# Patient Record
(demographics unavailable — no encounter records)

---

## 2024-11-18 NOTE — PHYSICAL EXAM
[Fully active, able to carry on all pre-disease performance without restriction] : Status 0 - Fully active, able to carry on all pre-disease performance without restriction [Normal] : affect appropriate [Ulcers] : no ulcers [Vesicles] : no vesicles [de-identified] : supple [de-identified] : (+)S1S2 RRR [de-identified] : no edema [de-identified] : few skin bruises on bilateral arms

## 2024-11-18 NOTE — REASON FOR VISIT
[Follow-Up Visit] : a follow-up visit for [Spouse] : spouse [FreeTextEntry2] :  follow-up visit for primary myelofibrosis and thrombocytosis

## 2024-11-18 NOTE — REVIEW OF SYSTEMS
[Fatigue] : fatigue [Diarrhea: Grade 0] : Diarrhea: Grade 0 [Joint Pain] : joint pain [Joint Stiffness] : joint stiffness [Insomnia] : insomnia [Negative] : Heme/Lymph [Fever] : no fever [Chills] : no chills [Night Sweats] : no night sweats [Recent Change In Weight] : ~T no recent weight change [Eye Pain] : no eye pain [Vision Problems] : no vision problems [Muscle Pain] : no muscle pain [Suicidal] : not suicidal [Anxiety] : no anxiety [Depression] : no depression [Proptosis] : no proptosis [Hot Flashes] : no hot flashes [Muscle Weakness] : no muscle weakness [Deepening Of The Voice] : no deepening of the voice [FreeTextEntry9] : per HPI

## 2024-11-18 NOTE — HISTORY OF PRESENT ILLNESS
[de-identified] : 74 y/o M with CAD (no PCI-refused) with LBBB following with Dr. Nascimento, also with history of thalassemia minor, and hypothyroid on synthroid here for evaluation of thrombocytosis. Patient had surgery for inguinal hernia in 2019, and he reported that he still has "something in there" that feels like a bump. Once in a while he said it will bother him but doesn't last very long. Previously had L sided inguinal hernia surgery over a decade ago and had a mesh placed which was complicated by bleeding. No history of having spleen removed. Patient denied any chest pain or dyspnea on exertion. He denied any increase in fatigue lately and he has a normal appetite. He has IBS that he has had for a very long time. Denied any night sweats. He has never had a blood clot, and he denied any family history of blood clots. Denied any history of bleeding episodes. No new skin rashes. He complains of chronic knee pain bilaterally that he has been suffering from for decades due to his job. He has a torn ACL, gets cortisone injections here and there. His R knee may be a little bit swollen and he feels he will need a replacement eventually. Otherwise he also has some herniated discs in his neck which give him a little pain, he will be doing PT. Has history of 2 arthroscopies on his R knee but otherwise no orthopedic history. Also complains of tinnitus for 3-4 years, constantly there but it varies in its intensity. Alcohol aggravates it so he has cut back recently. He typically used to drink "quite a bit" of alcohol but lately its been one drink daily on average. Was a light smoker in his 30s but not since then. No other illicit drugs.  Platelet levels on initial evaluation were sustained over 1 million, and went up over 1.4 million. vWF activity and antigen level were normal. 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 late ET with developing MF. Grade 0-1 reticulin fibrosis seen. He is taking aspirin daily..Bili had been noted to be elevated on blood work and had an abdominal US which showed some hepatosplenomegaly (mild) but otherwise unremarkable. Started on Jakafi which has been fairly effective.  Of note, has also been dealing with severe OA in R knee and back pain related to spinal stenosis.   Disease: PMF   Pathology: (4/27/22)Final Diagnosis : 1, 2. Bone marrow biopsy and bone marrow aspirate  - JAK2 positive myeloproliferative neoplasm  - Hypercellular bone marrow (more than 90% cellularity)  with erythroid predominant trilineage hematopoiesis and megakaryocytosis  Diagnostic Note: Per chart review, the patient with mild microcytic anemia, with a known  history of thalassemia minor and thrombocytosis. [id number] is positive.  Current bone marrow biopsy shows hypercellular bone marrow (more than  90% cellularity) with erythroid predominant trilineage hematopoiesis  and megakaryocytosis. Please note findings of a normal male karyotype, negative PDGFRA  FISH arrangement. [facility] myeloid NGS panel shows the following genomic  alterations Tier I: Variants of Strong Clinical Significance: ASXL1  p.[id number] ( [place]lele Freq.: 44%), JAK2 p.[id number] ( Allele Freq.:  28%) , Tier II: Variants of Potential Clinical Significance SF3B1 p.Ilw051Ood (Allele Freq.: 6%).  Based on the additional findings, the diagnosis has not changed.  Morphology: Microscopic description: 1. Biopsy: Sections of bone marrow biopsy and bone marrow fragments in  clot show hypercellularity (more than 90% cellularity), with erythroid  predominant trilineage hematopoiesis with maturation. M:E ratio  is reduced. Megakaryocytes are increased in number with many  large hyperlobulated megakaryocytes with abundant cytoplasm.  Occasional hyperchromatic megakaryocytes high N/C ratio. Megkarayocytes  demonstrates clustering. Iron stores are present.  2. Aspirate: Spicular and cellular; adequate for interpretation. Myeloid  and erythroid elements show progressive maturation with no  overt dysplasia or increase in blasts. Occasional erythroid elements show  budding. Megakaryocytes are increased in number and show clustering and  shows many hyperlobulated forms.   Tumor/ Prognostic Markers: Result: Normal male karyotype Karyotype: 46,XY[20].   Prognostics Scoring System: MIPSS70+ = 4 (intermediate risk), Myelofibrosis Scoring System: GIPSS: Int - 2   Current Treatment Status: Therapy: Jakafi.   [de-identified] : Patient seen for follow up on 11/18/2024. He is still on 5 mg BID of ruxolitinib. Platelets uptrending again and hemoglobin is down to 9.8. He is complaining of headache now for over a month without any abatement in the front of his head. Also mentioned that there was one time in [place] 3 weeks ago when he scratched something on his leg it started bleeding and it was completely unstoppable - was "like a faucet" and required wrapping really tight with a bandage which he kept on overnight, and it was still oozing the next day. Otherwise, his appetite is overall good - he eats two meals per day. Down 6 pounds in the last calendar year. Denied any night sweats at this point.

## 2024-11-18 NOTE — ASSESSMENT
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  BMBx on 4/27/22 showing evidence of PMN with JAK2 positivity. BM showed hypercellularity (>90% cellularity) and megakaryocytes are increased in number with many large hyperlobulated megakaryocytes with abundant cytoplasm and occasional hyperchromatic megakaryocytes w/ high N/C ratio. Megakaryocytes demonstrated clustering. There was also the presence of grade 0-1 reticulin fibrosis. Given this, this possibly can represent PMF although possibility of ET not ruled out. Discussed with hematopathology extensively and considering significant clustering and hypercellularity, this marrow is a little bit less consistent with a diagnosis of essential thrombocythemia and more likely represents a PMF diagnosis.  On risk stratification, on presentation considered intermediate risk via both GIPSS and MIPSS scoring system - discussed this with patient and wife after diagnosis. CALR and MPL were negative on peripheral blood with (+) JAK2 mutation. On presentation he was additionally found to have elevated T. Bili and US abdomen showed HSM. Encouraged abstinence from alcohol and this has improved to normal.  Since starting Ruxolitinib he has been with improvement of symptoms (mainly fatigue) and thrombocytosis- however anemia remains a dose limiting issue and now with worsening symptoms of anemia. Also with worsening thrombocytosis over 1M. Referred for transplant evaluation, currently considering options. Ruxolitinib was at 10mg BID but had worsening thrombcytosis and anemia. Had added hydroxyurea 500 mg QOD to control platelet count with decreasing Ruxolitinib dose, but was ineffective. Plan had been for possible switch to momelotinib - a newer JAK2 inhibitor which can have erythropoietic effects. Goal of therapy is to decrease thrombotic risk and increase quality of life.   Had been on Ruxolitinib taper down to 5 mg BID with continued hydroxyurea 500 mg daily. CBC then showed improvement in counts and PLT were 394K. Given improvement of his counts as well as symptoms, Ruxolitinib 5 mg BID was continued, and Hydea was stopped on 7/22/24. May consider Momelotinib in the future if platelets go up to very high levels off Hydrea.  Plan: -CBC reviewed today - hemoglobin 9.8 and platelets 674k.  -Continue Ruxolitinib 5 mg BID. -May consider Momelotinib in the future if platelets go up to very high levels off Hydrea. -One of the challenges with him is that he also has thalassemia trait, so he has very mild baseline microcytosis. No indication for iron supplementation. -Patient will continue on aspirin 81mg PO q day for thrombosis prophylaxis. He has been taking as prescribed. -Again discussed allogeneic transplantation. s/p initial visit at this point and considering options. Discussed the risks and benefits of undergoing allogeneic bone marrow transplant. Discussed that with his disease, the transplantation is the only way to cure, and without the transplantation there is a risk of development of AML in the future. Discussed the risk for GVHD and procedure related mortality with the allogeneic transplant. He vocalized his understanding. -Continue follow up with Cardiology. -Frontal headache - over 1 month with congestion and mucousy discharge - will check CT sinuses to rule out sinusitis.  -Patient understands and agrees with plan. All information explained to the best of my ability.  -RTC in 1 month.

## 2024-11-25 NOTE — DISCUSSION/SUMMARY
[FreeTextEntry1] : I had a discussion regarding today's visit, the diagnosis and treatment recommendations and options.  We also discussed changes since the last visit.  At this time, we discussed treatment options including a second cortisone injection or obtaining an MRI. He agreed to proceed with getting an MRI of the left wrist. He will follow-up after his MRI to discuss the results and treatment recommendations.  The patient has agreed to the above plan of management and has expressed full understanding.  All questions were fully answered to the patient's satisfaction.  My cumulative time spent on today's visit was greater than 30 minutes and included: Preparation for the visit, review of the medical records, review of pertinent diagnostic studies, examination and counseling of the patient on the above diagnosis, treatment plan and prognosis, orders of diagnostic tests, medications and/or appropriate procedures and documentation in the medical records of today's visit.

## 2024-11-25 NOTE — PHYSICAL EXAM
[de-identified] : PA, lateral, oblique, and PA  of his left wrist dated 8/5/2024 demonstrated no fractures or dislocations.  There is increase of the scapholunate angle on the lateral, but no widening of the scapholunate interval. [de-identified] : - Constitutional: This is a male in no obvious distress.   - Psych: Patient is alert and oriented to person, place and time.  Patient has a normal mood and affect.  - Cardiovascular: Normal pulses throughout the upper extremities.  No significant varicosities are noted in the upper extremities.  - Neuro: Strength and sensation are intact throughout the upper extremities.  Patient has normal coordination.  - Respiratory:  Patient exhibits no evidence of shortness of breath or difficulty breathing.  - Skin: No rashes, lesions, or other abnormalities are noted in the upper extremities.  ---   Examination of his left wrist and hand demonstrates no obvious dorsal capsular swelling.  He remains tender along the dorsal wrist capsule in the region of the scapholunate ligament.  He has some pain with flexion and extension of the wrist with limitation of motion.  There is no swelling or tenderness along the snuffbox or TFCC ligament.  There is a negative Hernandez.  He is neurovascularly intact distally.

## 2024-11-25 NOTE — END OF VISIT
[FreeTextEntry3] : This note was written by Smith Raman on 11/25/2024 acting solely as a scribe for Dr. Darryl Duffy.   All medical record entries made by the Scribe were at my, Dr. Darryl Duffy, direction and personally dictated by me on 11/25/2024. I have personally reviewed the chart and agree that the record accurately reflects my personal performance of the history, physical exam, assessment and plan.

## 2024-11-25 NOTE — HISTORY OF PRESENT ILLNESS
[FreeTextEntry1] : 5 1/2 months status post fall resulting in probable left wrist scapholunate ligament sprain.  See note from when he was seen in the office 2 1/2 months ago.  He was given a cortisone injection at the left wrist radiocarpal joint.  He returns today for left wrist pain. He reports that his previous cortisone injection provided his symptoms with some relief. He rates his pain as a 6/10. He denies swelling, clicking, or radiating pain.  He has myelofibrosis.  He is currently taking Jakafi. He is also taking tramadol for his knees. details… regular rate and rhythm/pedal edema

## 2024-11-25 NOTE — ADDENDUM
[FreeTextEntry1] :  I, Smith Raman, acted solely as a scribe for Dr. Duffy on this date on 11/25/2024.

## 2024-11-25 NOTE — PHYSICAL EXAM
[de-identified] : - Constitutional: This is a male in no obvious distress.   - Psych: Patient is alert and oriented to person, place and time.  Patient has a normal mood and affect.  - Cardiovascular: Normal pulses throughout the upper extremities.  No significant varicosities are noted in the upper extremities.  - Neuro: Strength and sensation are intact throughout the upper extremities.  Patient has normal coordination.  - Respiratory:  Patient exhibits no evidence of shortness of breath or difficulty breathing.  - Skin: No rashes, lesions, or other abnormalities are noted in the upper extremities.  ---   Examination of his left wrist and hand demonstrates no obvious dorsal capsular swelling.  He remains tender along the dorsal wrist capsule in the region of the scapholunate ligament.  He has some pain with flexion and extension of the wrist with limitation of motion.  There is no swelling or tenderness along the snuffbox or TFCC ligament.  There is a negative Hernandez.  He is neurovascularly intact distally.          [de-identified] : PA, lateral, oblique, and PA  of his left wrist dated 8/5/2024 demonstrated no fractures or dislocations.  There is increase of the scapholunate angle on the lateral, but no widening of the scapholunate interval.

## 2024-11-25 NOTE — HISTORY OF PRESENT ILLNESS
[FreeTextEntry1] : 5 1/2 months status post fall resulting in probable left wrist scapholunate ligament sprain.  See note from when he was seen in the office 2 1/2 months ago.  He was given a cortisone injection at the left wrist radiocarpal joint.  He returns today for left wrist pain. He reports that his previous cortisone injection provided his symptoms with some relief. He rates his pain as a 6/10. He denies swelling, clicking, or radiating pain.  He has myelofibrosis.  He is currently taking Jakafi. He is also taking tramadol for his knees.

## 2024-12-18 NOTE — DISCUSSION/SUMMARY
[FreeTextEntry1] : I reviewed the MRI results with him.  I had a discussion regarding today's visit, the diagnosis and treatment recommendations and options.  We also discussed changes since the last visit.  At this time, I recommended   The patient has agreed to the above plan of management and has expressed full understanding.  All questions were fully answered to the patient's satisfaction.  My cumulative time spent on today's visit was greater than 30 minutes and included: Preparation for the visit, review of the medical records, review of pertinent diagnostic studies, examination and counseling of the patient on the above diagnosis, treatment plan and prognosis, orders of diagnostic tests, medications and/or appropriate procedures and documentation in the medical records of today's visit.

## 2024-12-18 NOTE — REASON FOR VISIT
2nd call, left message to call back to schedule colonoscopy.   Repeat colonoscopy in 6 months to evaluate for polyp within the ischemic segment which may not be visible at this time.   Hx of colitis.  RECORDS ON FILE.   [Follow-Up Visit] : a follow-up visit for [FreeTextEntry2] : left wrist

## 2024-12-18 NOTE — HISTORY OF PRESENT ILLNESS
[FreeTextEntry1] : 5 1/2 months status post fall resulting in probable left wrist scapholunate ligament sprain.  He was given a cortisone injection at the left wrist radiocarpal joint greater than 3 1/2 months ago.    When he was last seen in the office 5 weeks ago, given his recurrent symptoms, he opted for repeat MRI.  He comes in to review the results and discuss treatment recommendations.  He is  He has myelofibrosis.  He is currently taking Jakafi. He is also taking tramadol for his knees.

## 2024-12-18 NOTE — PHYSICAL EXAM
[de-identified] : - Constitutional: This is a male in no obvious distress.   - Psych: Patient is alert and oriented to person, place and time.  Patient has a normal mood and affect.  - Cardiovascular: Normal pulses throughout the upper extremities.  No significant varicosities are noted in the upper extremities.  - Neuro: Strength and sensation are intact throughout the upper extremities.  Patient has normal coordination.  - Respiratory:  Patient exhibits no evidence of shortness of breath or difficulty breathing.  - Skin: No rashes, lesions, or other abnormalities are noted in the upper extremities.  ---   Examination of his left wrist and hand demonstrates no obvious dorsal capsular swelling.  He remains tender along the dorsal wrist capsule in the region of the scapholunate ligament.  He has some pain with flexion and extension of the wrist with limitation of motion.  There is no swelling or tenderness along the snuffbox or TFCC ligament.  There is a negative Hernandez.  He is neurovascularly intact distally.          [de-identified] : An MRI of his left wrist dated  PA, lateral, oblique, and PA  of his left wrist dated 8/5/2024 demonstrated no fractures or dislocations.  There is increase of the scapholunate angle on the lateral, but no widening of the scapholunate interval.

## 2024-12-18 NOTE — REVIEW OF SYSTEMS
[Left] : left [Negative] : Allergic/Immunologic [de-identified] : Myelofibrosis To get better and follow your care plan as instructed.

## 2024-12-19 NOTE — ASSESSMENT
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  BMBx on 4/27/22 showing evidence of PMN with JAK2 positivity. BM showed hypercellularity (>90% cellularity) and megakaryocytes are increased in number with many large hyperlobulated megakaryocytes with abundant cytoplasm and occasional hyperchromatic megakaryocytes w/ high N/C ratio. Megakaryocytes demonstrated clustering. There was also the presence of grade 0-1 reticulin fibrosis. Given this, this possibly can represent PMF although possibility of ET not ruled out. Discussed with hematopathology extensively and considering significant clustering and hypercellularity, this marrow is a little bit less consistent with a diagnosis of essential thrombocythemia and more likely represents a PMF diagnosis.  On risk stratification, on presentation considered intermediate risk via both GIPSS and MIPSS scoring system - discussed this with patient and wife after diagnosis. CALR and MPL were negative on peripheral blood with (+) JAK2 mutation. On presentation he was additionally found to have elevated T. Bili and US abdomen showed HSM. Encouraged abstinence from alcohol and this has improved to normal.  Since starting Ruxolitinib he has been with improvement of symptoms (mainly fatigue) and thrombocytosis- however anemia remains a dose limiting issue and now with worsening symptoms of anemia. Also with worsening thrombocytosis over 1M. Referred for transplant evaluation, currently considering options. Ruxolitinib was at 10mg BID but had worsening thrombcytosis and anemia. Had added hydroxyurea 500 mg QOD to control platelet count with decreasing Ruxolitinib dose, but was ineffective. Plan had been for possible switch to momelotinib - a newer JAK2 inhibitor which can have erythropoietic effects. Goal of therapy is to decrease thrombotic risk and increase quality of life.   Had been on Ruxolitinib taper down to 5 mg BID with continued hydroxyurea 500 mg daily. CBC then showed improvement in counts and PLT were 394K. Given improvement of his counts as well as symptoms, Ruxolitinib 5 mg BID was continued, and Hydea was stopped on 7/22/24. May consider Momelotinib in the future if platelets go up to very high levels off Hydrea.  Plan: -CBC PLT showing 824K Hgb stable at 9.7  -Continue Ruxolitinib 5 mg BID. -May consider Momelotinib in the future if platelets go up to very high levels off Hydrea. -One of the challenges with him is that he also has thalassemia trait, so he has very mild baseline microcytosis. No indication for iron supplementation. -Patient will continue on aspirin 81mg PO q day for thrombosis prophylaxis. He has been taking as prescribed. -Again discussed allogeneic transplantation. s/p initial visit at this point and considering options. Discussed the risks and benefits of undergoing allogeneic bone marrow transplant. Discussed that with his disease, the transplantation is the only way to cure, and without the transplantation there is a risk of development of AML in the future. Discussed the risk for GVHD and procedure related mortality with the allogeneic transplant. He vocalized his understanding. -Continue follow up with Cardiology. -Patient understands and agrees with plan. All information explained to the best of my ability.  -RTC in 1 month.

## 2024-12-19 NOTE — HISTORY OF PRESENT ILLNESS
[de-identified] : 72 y/o M with CAD (no PCI-refused) with LBBB following with Dr. Nascimento, also with history of thalassemia minor, and hypothyroid on synthroid here for evaluation of thrombocytosis. Patient had surgery for inguinal hernia in 2019, and he reported that he still has "something in there" that feels like a bump. Once in a while he said it will bother him but doesn't last very long. Previously had L sided inguinal hernia surgery over a decade ago and had a mesh placed which was complicated by bleeding. No history of having spleen removed. Patient denied any chest pain or dyspnea on exertion. He denied any increase in fatigue lately and he has a normal appetite. He has IBS that he has had for a very long time. Denied any night sweats. He has never had a blood clot, and he denied any family history of blood clots. Denied any history of bleeding episodes. No new skin rashes. He complains of chronic knee pain bilaterally that he has been suffering from for decades due to his job. He has a torn ACL, gets cortisone injections here and there. His R knee may be a little bit swollen and he feels he will need a replacement eventually. Otherwise he also has some herniated discs in his neck which give him a little pain, he will be doing PT. Has history of 2 arthroscopies on his R knee but otherwise no orthopedic history. Also complains of tinnitus for 3-4 years, constantly there but it varies in its intensity. Alcohol aggravates it so he has cut back recently. He typically used to drink "quite a bit" of alcohol but lately its been one drink daily on average. Was a light smoker in his 30s but not since then. No other illicit drugs.  Platelet levels on initial evaluation were sustained over 1 million, and went up over 1.4 million. vWF activity and antigen level were normal. 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 late ET with developing MF. Grade 0-1 reticulin fibrosis seen. He is taking aspirin daily..Bili had been noted to be elevated on blood work and had an abdominal US which showed some hepatosplenomegaly (mild) but otherwise unremarkable. Started on Jakafi which has been fairly effective.  Of note, has also been dealing with severe OA in R knee and back pain related to spinal stenosis.   Disease: PMF   Pathology: (4/27/22)Final Diagnosis : 1, 2. Bone marrow biopsy and bone marrow aspirate  - JAK2 positive myeloproliferative neoplasm  - Hypercellular bone marrow (more than 90% cellularity)  with erythroid predominant trilineage hematopoiesis and megakaryocytosis  Diagnostic Note: Per chart review, the patient with mild microcytic anemia, with a known  history of thalassemia minor and thrombocytosis. [id number] is positive.  Current bone marrow biopsy shows hypercellular bone marrow (more than  90% cellularity) with erythroid predominant trilineage hematopoiesis  and megakaryocytosis. Please note findings of a normal male karyotype, negative PDGFRA  FISH arrangement. [employer] myeloid NGS panel shows the following genomic  alterations Tier I: Variants of Strong Clinical Significance: ASXL1  p.Bpd364Xsuli*9 ( Allele Freq.: 44%), JAK2 p.Rzj846Yit ( Allele Freq.:  28%) , Tier II: Variants of Potential Clinical Significance SF3B1 p.Ipo827Alm (Allele Freq.: 6%).  Based on the additional findings, the diagnosis has not changed.  Morphology: Microscopic description: 1. Biopsy: Sections of bone marrow biopsy and bone marrow fragments in  clot show hypercellularity (more than 90% cellularity), with erythroid  predominant trilineage hematopoiesis with maturation. M:E ratio  is reduced. Megakaryocytes are increased in number with many  large hyperlobulated megakaryocytes with abundant cytoplasm.  Occasional hyperchromatic megakaryocytes high N/C ratio. Megkarayocytes  demonstrates clustering. Iron stores are present.  2. Aspirate: Spicular and cellular; adequate for interpretation. Myeloid  and erythroid elements show progressive maturation with no  overt dysplasia or increase in blasts. Occasional erythroid elements show  budding. Megakaryocytes are increased in number and show clustering and  shows many hyperlobulated forms.   Tumor/ Prognostic Markers: Result: Normal male karyotype Karyotype: 46,XY[20].   Prognostics Scoring System: MIPSS70+ = 4 (intermediate risk), Myelofibrosis Scoring System: GIPSS: Int - 2   Current Treatment Status: Therapy: Jakafi.   [de-identified] : Patient seen for follow up.  He is still on 5 mg BID of ruxolitinib. Overall, he is in his usual state of health. He continues to have mild fatigue and mild shortness of breath occasionally associated with exertion. Still considering R knee replacement for chronic pain. Taking Tramadol and Aleve as needed with good relief. Appetite is stable has gained weight sine last visit. Denied any fevers or night sweats.

## 2024-12-19 NOTE — REVIEW OF SYSTEMS
[Fatigue] : fatigue [Diarrhea: Grade 0] : Diarrhea: Grade 0 [Joint Pain] : joint pain [Joint Stiffness] : joint stiffness [Insomnia] : insomnia [Negative] : Heme/Lymph [Fever] : no fever [Chills] : no chills [Night Sweats] : no night sweats [Recent Change In Weight] : ~T no recent weight change [Eye Pain] : no eye pain [Vision Problems] : no vision problems [Shortness Of Breath] : no shortness of breath [Wheezing] : no wheezing [Cough] : no cough [SOB on Exertion] : shortness of breath during exertion [Muscle Pain] : no muscle pain [Suicidal] : not suicidal [Anxiety] : no anxiety [Depression] : no depression [Proptosis] : no proptosis [Hot Flashes] : no hot flashes [Muscle Weakness] : no muscle weakness [Deepening Of The Voice] : no deepening of the voice [FreeTextEntry9] : per HPI

## 2024-12-19 NOTE — PHYSICAL EXAM
[Fully active, able to carry on all pre-disease performance without restriction] : Status 0 - Fully active, able to carry on all pre-disease performance without restriction [Normal] : affect appropriate [Ulcers] : no ulcers [Vesicles] : no vesicles [de-identified] : supple [de-identified] : (+)S1S2 RRR [de-identified] : no edema [de-identified] : limited in RLE [de-identified] : warm/dry

## 2024-12-23 NOTE — PHYSICAL EXAM
[de-identified] : - Constitutional: This is a male in no obvious distress.   - Psych: Patient is alert and oriented to person, place and time.  Patient has a normal mood and affect.  - Cardiovascular: Normal pulses throughout the upper extremities.  No significant varicosities are noted in the upper extremities.  - Neuro: Strength and sensation are intact throughout the upper extremities.  Patient has normal coordination.  - Respiratory:  Patient exhibits no evidence of shortness of breath or difficulty breathing.  - Skin: No rashes, lesions, or other abnormalities are noted in the upper extremities.  ---   Examination of his left wrist and hand demonstrates no obvious dorsal capsular swelling.  He remains tender along the dorsal wrist capsule in the region of the scapholunate ligament.  He has some pain with flexion and extension of the wrist with limitation of motion.  There is no swelling or tenderness along the snuffbox or TFCC ligament.  There is a negative Hernandez.  He is neurovascularly intact distally.          [de-identified] : An MRI of his left wrist dated 12/18/2024 demonstrated: Widening of the scapholunate interval with intermediate signal involving the scapholunate ligament compatible with a prior sprain. No acute high-grade partial or complete ligamentous tear. Curvilinear low PD signal with surrounding cystic changes along the dorsal margin of the capitate. This is reflective of a chronic nondisplaced fracture. Mild tenosynovitis of the flexor tendons at the level of the wrist.  PA, lateral, oblique, and PA  of his left wrist dated 8/5/2024 demonstrated no fractures or dislocations.  There is increase of the scapholunate angle on the lateral, but no widening of the scapholunate interval.

## 2024-12-30 NOTE — DISCUSSION/SUMMARY
[FreeTextEntry1] : I reviewed the MRI results with him.  I had a discussion regarding today's visit, the diagnosis and treatment recommendations and options.  We also discussed changes since the last visit.  At this time, we discussed options including observation, a second cortisone injection, or surgical management, which at this point I would not recommend. As his symptoms are relatively mild, I recommended observation at this time. I did tell him that there is a possibility that his symptoms will resolve within a year of the incident. If his symptoms do not improve or continue to worsen, he will return to my office to discuss further treatment recommendations.  The patient has agreed to the above plan of management and has expressed full understanding.  All questions were fully answered to the patient's satisfaction.  My cumulative time spent on today's visit was greater than 30 minutes and included: Preparation for the visit, review of the medical records, review of pertinent diagnostic studies, examination and counseling of the patient on the above diagnosis, treatment plan and prognosis, orders of diagnostic tests, medications and/or appropriate procedures and documentation in the medical records of today's visit.

## 2024-12-30 NOTE — HISTORY OF PRESENT ILLNESS
[FreeTextEntry1] : 5 1/2 months status post fall resulting in probable left wrist scapholunate ligament sprain.  He was given a cortisone injection at the left wrist radiocarpal joint greater than 3 1/2 months ago.    When he was last seen in the office 5 weeks ago, given his recurrent symptoms, he opted for repeat MRI.  He comes in to review the results and discuss treatment recommendations.  He is overall doing well today. He rates his pain as a 6/10. He reports no new symptoms since his previous visit.  He has myelofibrosis.  He is currently taking Jakafi. He is also taking tramadol for his knees.

## 2024-12-30 NOTE — ADDENDUM
[FreeTextEntry1] :  I, Smith Raman, acted solely as a scribe for Dr. Duffy on this date on 12/30/2024.

## 2024-12-30 NOTE — PHYSICAL EXAM
[de-identified] : - Constitutional: This is a male in no obvious distress.   - Psych: Patient is alert and oriented to person, place and time.  Patient has a normal mood and affect.  - Cardiovascular: Normal pulses throughout the upper extremities.  No significant varicosities are noted in the upper extremities.  - Neuro: Strength and sensation are intact throughout the upper extremities.  Patient has normal coordination.   ---   Examination of his left wrist and hand demonstrates no obvious dorsal capsular swelling.  He has mild residual tenderness dorsally along the dorsal wrist capsule in the region of the scapholunate ligament.  He has some pain with flexion and extension of the wrist with limitation of motion.  There is no swelling or tenderness along the snuffbox or TFCC ligament.  There is a negative Hernandez.  He is neurovascularly intact distally.          [de-identified] : An MRI of his left wrist dated 12/18/2024 demonstrated: Widening of the scapholunate interval with intermediate signal involving the scapholunate ligament compatible with a prior sprain. No acute high-grade partial or complete ligamentous tear. Curvilinear low PD signal with surrounding cystic changes along the dorsal margin of the capitate. This is reflective of a chronic nondisplaced fracture. Mild tenosynovitis of the flexor tendons at the level of the wrist.  PA, lateral, oblique, and PA  of his left wrist dated 8/5/2024 demonstrated no fractures or dislocations.  There is increase of the scapholunate angle on the lateral, but no widening of the scapholunate interval.

## 2024-12-30 NOTE — PHYSICAL EXAM
[de-identified] : - Constitutional: This is a male in no obvious distress.   - Psych: Patient is alert and oriented to person, place and time.  Patient has a normal mood and affect.  - Cardiovascular: Normal pulses throughout the upper extremities.  No significant varicosities are noted in the upper extremities.  - Neuro: Strength and sensation are intact throughout the upper extremities.  Patient has normal coordination.   ---   Examination of his left wrist and hand demonstrates no obvious dorsal capsular swelling.  He has mild residual tenderness dorsally along the dorsal wrist capsule in the region of the scapholunate ligament.  He has some pain with flexion and extension of the wrist with limitation of motion.  There is no swelling or tenderness along the snuffbox or TFCC ligament.  There is a negative Hernandez.  He is neurovascularly intact distally.          [de-identified] : An MRI of his left wrist dated 12/18/2024 demonstrated: Widening of the scapholunate interval with intermediate signal involving the scapholunate ligament compatible with a prior sprain. No acute high-grade partial or complete ligamentous tear. Curvilinear low PD signal with surrounding cystic changes along the dorsal margin of the capitate. This is reflective of a chronic nondisplaced fracture. Mild tenosynovitis of the flexor tendons at the level of the wrist.  PA, lateral, oblique, and PA  of his left wrist dated 8/5/2024 demonstrated no fractures or dislocations.  There is increase of the scapholunate angle on the lateral, but no widening of the scapholunate interval.

## 2024-12-30 NOTE — END OF VISIT
[FreeTextEntry3] : This note was written by Smith Raman on 12/30/2024 acting solely as a scribe for Dr. Darryl Duffy.   All medical record entries made by the Scribe were at my, Dr. Darryl Duffy, direction and personally dictated by me on 12/30/2024. I have personally reviewed the chart and agree that the record accurately reflects my personal performance of the history, physical exam, assessment and plan.

## 2025-02-02 NOTE — PHYSICAL EXAM
[Fully active, able to carry on all pre-disease performance without restriction] : Status 0 - Fully active, able to carry on all pre-disease performance without restriction [Normal] : affect appropriate [Ulcers] : no ulcers [Vesicles] : no vesicles [de-identified] : supple [de-identified] : (+)S1S2 RRR [de-identified] : no edema

## 2025-02-02 NOTE — ASSESSMENT
[FreeTextEntry1] : 73 y/o M with beta thal minor with apparent CAD no PCI in the past, on aspirin with PMF with severe thrombocytosis here for follow up.  BMBx on 4/27/22 showing evidence of PMN with JAK2 positivity. BM showed hypercellularity (>90% cellularity) and megakaryocytes are increased in number with many large hyperlobulated megakaryocytes with abundant cytoplasm and occasional hyperchromatic megakaryocytes w/ high N/C ratio. Megakaryocytes demonstrated clustering. There was also the presence of grade 0-1 reticulin fibrosis. Given this, this possibly can represent PMF although possibility of ET not ruled out. Discussed with hematopathology extensively and considering significant clustering and hypercellularity, this marrow is a little bit less consistent with a diagnosis of essential thrombocythemia and more likely represents a PMF diagnosis.  On risk stratification, on presentation considered intermediate risk via both GIPSS and MIPSS scoring system - discussed this with patient and wife after diagnosis. CALR and MPL were negative on peripheral blood with (+) JAK2 mutation. On presentation he was additionally found to have elevated T. Bili and US abdomen showed HSM. Encouraged abstinence from alcohol and this has improved to normal.  Since starting Ruxolitinib he has been with improvement of symptoms (mainly fatigue) and thrombocytosis- however anemia remains a dose limiting issue and now with worsening symptoms of anemia. Also with worsening thrombocytosis over 1M. Referred for transplant evaluation, currently considering options. Ruxolitinib was at 10mg BID but had worsening thrombcytosis and anemia. Had added hydroxyurea 500 mg QOD to control platelet count with decreasing Ruxolitinib dose, but was ineffective. Plan had been for possible switch to momelotinib - a newer JAK2 inhibitor which can have erythropoietic effects. Goal of therapy is to decrease thrombotic risk and increase quality of life.   Had been on Ruxolitinib taper down to 5 mg BID with continued hydroxyurea 500 mg daily. CBC then showed improvement in counts and PLT were 394K. Given improvement of his counts as well as symptoms, Ruxolitinib 5 mg BID was continued, and Hydea was stopped on 7/22/24. May consider Momelotinib in the future if platelets go up to very high levels off Hydrea.  Plan: -CBC PLT showing 1.2K Hgb stable at 10.1  -Continue Ruxolitinib 5 mg BID. Will add on hydrea for cytoreduction of platelets of 1.2K.  -May consider Momelotinib in the future.  -One of the challenges with him is that he also has thalassemia trait, so he has very mild baseline microcytosis. No indication for iron supplementation. -Patient will continue on aspirin 81mg PO q day for thrombosis prophylaxis. He has been taking as prescribed. Will check vWF panel given platelets >1000k -Again discussed allogeneic transplantation. s/p initial visit at this point and considering options. Discussed the risks and benefits of undergoing allogeneic bone marrow transplant. Discussed that with his disease, the transplantation is the only way to cure, and without the transplantation there is a risk of development of AML in the future. Discussed the risk for GVHD and procedure related mortality with the allogeneic transplant. He vocalized his understanding. -Continue follow up with Cardiology. -Patient understands and agrees with plan. All information explained to the best of my ability.  -RTC in 1 month.   Plan d/w Dr. Goldberg and patient in the office.  Gian Madrid MD. PGY-V Hematology Oncology Fellow

## 2025-02-02 NOTE — PHYSICAL EXAM
[Fully active, able to carry on all pre-disease performance without restriction] : Status 0 - Fully active, able to carry on all pre-disease performance without restriction [Normal] : affect appropriate [Ulcers] : no ulcers [Vesicles] : no vesicles [de-identified] : supple [de-identified] : (+)S1S2 RRR [de-identified] : no edema

## 2025-02-02 NOTE — HISTORY OF PRESENT ILLNESS
[de-identified] : 74 y/o M with CAD (no PCI-refused) with LBBB following with Dr. Nascimento, also with history of thalassemia minor, and hypothyroid on synthroid here for evaluation of thrombocytosis. Patient had surgery for inguinal hernia in 2019, and he reported that he still has "something in there" that feels like a bump. Once in a while he said it will bother him but doesn't last very long. Previously had L sided inguinal hernia surgery over a decade ago and had a mesh placed which was complicated by bleeding. No history of having spleen removed. Patient denied any chest pain or dyspnea on exertion. He denied any increase in fatigue lately and he has a normal appetite. He has IBS that he has had for a very long time. Denied any night sweats. He has never had a blood clot, and he denied any family history of blood clots. Denied any history of bleeding episodes. No new skin rashes. He complains of chronic knee pain bilaterally that he has been suffering from for decades due to his job. He has a torn ACL, gets cortisone injections here and there. His R knee may be a little bit swollen and he feels he will need a replacement eventually. Otherwise he also has some herniated discs in his neck which give him a little pain, he will be doing PT. Has history of 2 arthroscopies on his R knee but otherwise no orthopedic history. Also complains of tinnitus for 3-4 years, constantly there but it varies in its intensity. Alcohol aggravates it so he has cut back recently. He typically used to drink "quite a bit" of alcohol but lately its been one drink daily on average. Was a light smoker in his 30s but not since then. No other illicit drugs.  Platelet levels on initial evaluation were sustained over 1 million, and went up over 1.4 million. vWF activity and antigen level were normal. 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 late ET with developing MF. Grade 0-1 reticulin fibrosis seen. He is taking aspirin daily..Bili had been noted to be elevated on blood work and had an abdominal US which showed some hepatosplenomegaly (mild) but otherwise unremarkable. Started on Jakafi which has been fairly effective.  Of note, has also been dealing with severe OA in R knee and back pain related to spinal stenosis.   Disease: PMF   Pathology: (4/27/22)Final Diagnosis : 1, 2. Bone marrow biopsy and bone marrow aspirate  - JAK2 positive myeloproliferative neoplasm  - Hypercellular bone marrow (more than 90% cellularity)  with erythroid predominant trilineage hematopoiesis and megakaryocytosis  Diagnostic Note: Per chart review, the patient with mild microcytic anemia, with a known  history of thalassemia minor and thrombocytosis. [id number] is positive.  Current bone marrow biopsy shows hypercellular bone marrow (more than  90% cellularity) with erythroid predominant trilineage hematopoiesis  and megakaryocytosis. Please note findings of a normal male karyotype, negative PDGFRA  FISH arrangement. [employer] myeloid NGS panel shows the following genomic  alterations Tier I: Variants of Strong Clinical Significance: ASXL1  p.Eni269Rateb*9 ( Allele Freq.: 44%), JAK2 p.Pmx172Vss ( Allele Freq.:  28%) , Tier II: Variants of Potential Clinical Significance SF3B1 p.Bla788Sjo (Allele Freq.: 6%).  Based on the additional findings, the diagnosis has not changed.  Morphology: Microscopic description: 1. Biopsy: Sections of bone marrow biopsy and bone marrow fragments in  clot show hypercellularity (more than 90% cellularity), with erythroid  predominant trilineage hematopoiesis with maturation. M:E ratio  is reduced. Megakaryocytes are increased in number with many  large hyperlobulated megakaryocytes with abundant cytoplasm.  Occasional hyperchromatic megakaryocytes high N/C ratio. Megkarayocytes  demonstrates clustering. Iron stores are present.  2. Aspirate: Spicular and cellular; adequate for interpretation. Myeloid  and erythroid elements show progressive maturation with no  overt dysplasia or increase in blasts. Occasional erythroid elements show  budding. Megakaryocytes are increased in number and show clustering and  shows many hyperlobulated forms.   Tumor/ Prognostic Markers: Result: Normal male karyotype Karyotype: 46,XY[20].   Prognostics Scoring System: MIPSS70+ = 4 (intermediate risk), Myelofibrosis Scoring System: GIPSS: Int - 2   Current Treatment Status: Therapy: Jakafi.   [de-identified] : Patient seen for follow up on 1/30/25.  He is still on 5 mg BID of ruxolitinib. Overall, he is feeling more fatigued. No night sweats or fevers. Does feel more tired and not as good as he normally dose.  He has mild shortness of breath occasionally associated with exertion. Still considering R knee replacement for chronic pain. Taking Tramadol and Aleve as needed with good relief. Appetite is stable weight is stable.

## 2025-02-02 NOTE — REVIEW OF SYSTEMS
[Fatigue] : fatigue [SOB on Exertion] : shortness of breath during exertion [Diarrhea: Grade 0] : Diarrhea: Grade 0 [Joint Pain] : joint pain [Joint Stiffness] : joint stiffness [Insomnia] : insomnia [Negative] : Heme/Lymph [Fever] : no fever [Chills] : no chills [Night Sweats] : no night sweats [Recent Change In Weight] : ~T no recent weight change [Eye Pain] : no eye pain [Vision Problems] : no vision problems [Shortness Of Breath] : no shortness of breath [Wheezing] : no wheezing [Cough] : no cough [Muscle Pain] : no muscle pain [Suicidal] : not suicidal [Anxiety] : no anxiety [Depression] : no depression [Proptosis] : no proptosis [Hot Flashes] : no hot flashes [Muscle Weakness] : no muscle weakness [Deepening Of The Voice] : no deepening of the voice [FreeTextEntry9] : per HPI

## 2025-02-02 NOTE — HISTORY OF PRESENT ILLNESS
[de-identified] : 72 y/o M with CAD (no PCI-refused) with LBBB following with Dr. Nascimento, also with history of thalassemia minor, and hypothyroid on synthroid here for evaluation of thrombocytosis. Patient had surgery for inguinal hernia in 2019, and he reported that he still has "something in there" that feels like a bump. Once in a while he said it will bother him but doesn't last very long. Previously had L sided inguinal hernia surgery over a decade ago and had a mesh placed which was complicated by bleeding. No history of having spleen removed. Patient denied any chest pain or dyspnea on exertion. He denied any increase in fatigue lately and he has a normal appetite. He has IBS that he has had for a very long time. Denied any night sweats. He has never had a blood clot, and he denied any family history of blood clots. Denied any history of bleeding episodes. No new skin rashes. He complains of chronic knee pain bilaterally that he has been suffering from for decades due to his job. He has a torn ACL, gets cortisone injections here and there. His R knee may be a little bit swollen and he feels he will need a replacement eventually. Otherwise he also has some herniated discs in his neck which give him a little pain, he will be doing PT. Has history of 2 arthroscopies on his R knee but otherwise no orthopedic history. Also complains of tinnitus for 3-4 years, constantly there but it varies in its intensity. Alcohol aggravates it so he has cut back recently. He typically used to drink "quite a bit" of alcohol but lately its been one drink daily on average. Was a light smoker in his 30s but not since then. No other illicit drugs.  Platelet levels on initial evaluation were sustained over 1 million, and went up over 1.4 million. vWF activity and antigen level were normal. 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 late ET with developing MF. Grade 0-1 reticulin fibrosis seen. He is taking aspirin daily..Bili had been noted to be elevated on blood work and had an abdominal US which showed some hepatosplenomegaly (mild) but otherwise unremarkable. Started on Jakafi which has been fairly effective.  Of note, has also been dealing with severe OA in R knee and back pain related to spinal stenosis.   Disease: PMF   Pathology: (4/27/22)Final Diagnosis : 1, 2. Bone marrow biopsy and bone marrow aspirate  - JAK2 positive myeloproliferative neoplasm  - Hypercellular bone marrow (more than 90% cellularity)  with erythroid predominant trilineage hematopoiesis and megakaryocytosis  Diagnostic Note: Per chart review, the patient with mild microcytic anemia, with a known  history of thalassemia minor and thrombocytosis. [id number] is positive.  Current bone marrow biopsy shows hypercellular bone marrow (more than  90% cellularity) with erythroid predominant trilineage hematopoiesis  and megakaryocytosis. Please note findings of a normal male karyotype, negative PDGFRA  FISH arrangement. [facility] myeloid NGS panel shows the following genomic  alterations Tier I: Variants of Strong Clinical Significance: ASXL1  p.[id number]*9 ( Allele Freq.: 44%), JAK2 p.[id number] ( Allele Freq.:  28%) , Tier II: Variants of Potential Clinical Significance SF3B1 p.[id number] (Allele Freq.: 6%).  Based on the additional findings, the diagnosis has not changed.  Morphology: Microscopic description: 1. Biopsy: Sections of bone marrow biopsy and bone marrow fragments in  clot show hypercellularity (more than 90% cellularity), with erythroid  predominant trilineage hematopoiesis with maturation. M:E ratio  is reduced. Megakaryocytes are increased in number with many  large hyperlobulated megakaryocytes with abundant cytoplasm.  Occasional hyperchromatic megakaryocytes high N/C ratio. Megkarayocytes  demonstrates clustering. Iron stores are present.  2. Aspirate: Spicular and cellular; adequate for interpretation. Myeloid  and erythroid elements show progressive maturation with no  overt dysplasia or increase in blasts. Occasional erythroid elements show  budding. Megakaryocytes are increased in number and show clustering and  shows many hyperlobulated forms.   Tumor/ Prognostic Markers: Result: Normal male karyotype Karyotype: 46,XY[20].   Prognostics Scoring System: MIPSS70+ = 4 (intermediate risk), Myelofibrosis Scoring System: GIPSS: Int - 2   Current Treatment Status: Therapy: Jakafi.   [de-identified] : Patient seen for follow up on 1/30/25.  He is still on 5 mg BID of ruxolitinib. Overall, he is feeling more fatigued. No night sweats or fevers. Does feel more tired and not as good as he normally dose.  He has mild shortness of breath occasionally associated with exertion. Still considering R knee replacement for chronic pain. Taking Tramadol and Aleve as needed with good relief. Appetite is stable weight is stable.

## 2025-02-02 NOTE — ASSESSMENT
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  BMBx on 4/27/22 showing evidence of PMN with JAK2 positivity. BM showed hypercellularity (>90% cellularity) and megakaryocytes are increased in number with many large hyperlobulated megakaryocytes with abundant cytoplasm and occasional hyperchromatic megakaryocytes w/ high N/C ratio. Megakaryocytes demonstrated clustering. There was also the presence of grade 0-1 reticulin fibrosis. Given this, this possibly can represent PMF although possibility of ET not ruled out. Discussed with hematopathology extensively and considering significant clustering and hypercellularity, this marrow is a little bit less consistent with a diagnosis of essential thrombocythemia and more likely represents a PMF diagnosis.  On risk stratification, on presentation considered intermediate risk via both GIPSS and MIPSS scoring system - discussed this with patient and wife after diagnosis. CALR and MPL were negative on peripheral blood with (+) JAK2 mutation. On presentation he was additionally found to have elevated T. Bili and US abdomen showed HSM. Encouraged abstinence from alcohol and this has improved to normal.  Since starting Ruxolitinib he has been with improvement of symptoms (mainly fatigue) and thrombocytosis- however anemia remains a dose limiting issue and now with worsening symptoms of anemia. Also with worsening thrombocytosis over 1M. Referred for transplant evaluation, currently considering options. Ruxolitinib was at 10mg BID but had worsening thrombcytosis and anemia. Had added hydroxyurea 500 mg QOD to control platelet count with decreasing Ruxolitinib dose, but was ineffective. Plan had been for possible switch to momelotinib - a newer JAK2 inhibitor which can have erythropoietic effects. Goal of therapy is to decrease thrombotic risk and increase quality of life.   Had been on Ruxolitinib taper down to 5 mg BID with continued hydroxyurea 500 mg daily. CBC then showed improvement in counts and PLT were 394K. Given improvement of his counts as well as symptoms, Ruxolitinib 5 mg BID was continued, and Hydea was stopped on 7/22/24. May consider Momelotinib in the future if platelets go up to very high levels off Hydrea.  Plan: -CBC PLT showing 1.2K Hgb stable at 10.1  -Continue Ruxolitinib 5 mg BID. Will add on hydrea for cytoreduction of platelets of 1.2K.  -May consider Momelotinib in the future.  -One of the challenges with him is that he also has thalassemia trait, so he has very mild baseline microcytosis. No indication for iron supplementation. -Patient will continue on aspirin 81mg PO q day for thrombosis prophylaxis. He has been taking as prescribed. Will check vWF panel given platelets >1000k -Again discussed allogeneic transplantation. s/p initial visit at this point and considering options. Discussed the risks and benefits of undergoing allogeneic bone marrow transplant. Discussed that with his disease, the transplantation is the only way to cure, and without the transplantation there is a risk of development of AML in the future. Discussed the risk for GVHD and procedure related mortality with the allogeneic transplant. He vocalized his understanding. -Continue follow up with Cardiology. -Patient understands and agrees with plan. All information explained to the best of my ability.  -RTC in 1 month.   Plan d/w Dr. Goldberg and patient in the office.  Gian Madrid MD. PGY-V Hematology Oncology Fellow

## 2025-02-25 NOTE — ASSESSMENT
"[facility]  Pediatric Speech Therapy Treatment Note    Date: 7/27/2023    Patient Name: Joaquina Clemens  MRN: [id number]  Therapy Diagnosis:   Encounter Diagnosis   Name Primary?    Receptive expressive language disorder Yes      Physician: Saskia Dockery MD   Physician Orders:  [id number] - AMB REFERRAL/CONSULT TO SPEECH THERAPY    Medical Diagnosis: F84.0 (ICD-10-CM)  Autism spectrum disorder,  F80.1 (ICD-10-CM) - Expressive language delay   Age: 6 y.o. 1 m.o.    Visit # / Visits Authorized: 18/32    Date of Evaluation: 3/1/2023   Plan of Care Expiration Date: 9/1/2023    Authorization Date: 3/13/2023 -10/20/23   Testing last administered: 3/1/2023      Time In: 11:45 PM  Time Out: 12:30 PM  Total Billable Time: 45 minutes    Precautions: [place] and Child Safety    Subjective:   Joaquina entered the therapy room willingly and independently. Joaquina participated in all activities presented with moderate redirection today.   Caregiver reports: Having difficulty with behavior.  She was compliant to home exercise program.   Response to previous treatment: good   Caregiver did not attend today's session.  Pain: Joaquina was unable to rate pain on a numeric scale, but no pain behaviors were noted in today's session.  Objective:   UNTIMED  Procedure Min.   Speech- Language- Voice Therapy    45   Total Untimed Units: 1  Charges Billed/# of units: 1    Short Term Goals: (3 months) Current Progress:   1.Complete formal language assessment to determine severity and specific receptive and expressive skills necessary to work on within speech and language therapy.  Progressing/ Not Met 7/27/2023  DNT     3. Answer simple "what" and "where" questions with 80% accuracy over three consecutive sessions  Progressing/ Not Met 7/27/2023  What: Goal Met 4/10/23  Where: 70% FO3 pictures min cues      5. Will demonstrate understanding of adjectives with 80% accuracy during variety of language based " [FreeTextEntry1] : 73 y/o M with beta thal minor with apparent CAD no PCI in the past, on aspirin with PMF with severe thrombocytosis here for follow up.  BMBx on 4/27/22 showing evidence of PMN with JAK2 positivity. BM showed hypercellularity (>90% cellularity) and megakaryocytes are increased in number with many large hyperlobulated megakaryocytes with abundant cytoplasm and occasional hyperchromatic megakaryocytes w/ high N/C ratio. Megakaryocytes demonstrated clustering. There was also the presence of grade 0-1 reticulin fibrosis. Given this, this possibly can represent PMF although possibility of ET not ruled out. Discussed with hematopathology extensively and considering significant clustering and hypercellularity, this marrow is a little bit less consistent with a diagnosis of essential thrombocythemia and more likely represents a PMF diagnosis.  On risk stratification, on presentation considered intermediate risk via both GIPSS and MIPSS scoring system - discussed this with patient and wife after diagnosis. CALR and MPL were negative on peripheral blood with (+) JAK2 mutation. On presentation he was additionally found to have elevated T. Bili and US abdomen showed HSM. Encouraged abstinence from alcohol and this has improved to normal.  Since starting Ruxolitinib he has been with improvement of symptoms (mainly fatigue) and thrombocytosis- however anemia remains a dose limiting issue and now with worsening symptoms of anemia. Also with worsening thrombocytosis over 1M. Referred for transplant evaluation, currently considering options. Ruxolitinib was at 10mg BID but had worsening thrombcytosis and anemia. Had added hydroxyurea 500 mg QOD to control platelet count with decreasing Ruxolitinib dose, but was ineffective. Plan had been for possible switch to momelotinib - a newer JAK2 inhibitor which can have erythropoietic effects. Goal of therapy is to decrease thrombotic risk and increase quality of life.   Had been on Ruxolitinib taper down to 5 mg BID with continued hydroxyurea 500 mg daily. CBC then showed improvement in counts and PLT were 394K. Given improvement of his counts as well as symptoms, Ruxolitinib 5 mg BID was continued, and Hydea was stopped on 7/22/24 and the restarted in January for increasing PLT count of 1.2K.  Plan: -CBC PLT showing decrease to 747 Hgb 9.5 -Continue Ruxolitinib 5 mg BID. and Hydrea at this time. -May consider Momelotinib in the future.  -One of the challenges with him is that he also has thalassemia trait, so he has very mild baseline microcytosis. No indication for iron supplementation. -Patient will continue on aspirin 81mg PO q day for thrombosis prophylaxis. He has been taking as prescribed. At last visit checked vWF panel given platelets >1000k and normal. -Continued discussions surrounding allogeneic transplantation and considering options. Discussed the risks and benefits of undergoing allogeneic bone marrow transplant. Discussed that with his disease, the transplantation is the only way to cure, and without the transplantation there is a risk of development of AML in the future. Discussed the risk for GVHD and procedure related mortality with the allogeneic transplant. He vocalized his understanding. -Continue follow up with Cardiology. -Patient would like to pursue knee replacement later this year. Will coordinate with Ortho for recommendations at that time.  -Patient understands and agrees with plan. All information explained to the best of my ability.  -Patient is going to [place] for 2 months and will return in May. We discussed the importance of continued blood monitoring at this time, and he was given a script for repeat labs in 1 month and we will re-evaluate plan at that time.  -RTC on return from [place] scheduled.  activities given minimal to no cues over 3 consecutive sessions.  Progressing/ Not Met 7/27/2023   80% min cues (no modifiers) (3/3) Goal Met 7/3/23    DNT, previously:   With 1 modifier: 90% min cues (1/3)   8. Demonstrate understanding of the spatial concepts in, on, out, off, by without gestural cues by correctly manipulating objects 8 out of 10 trials per session over three consecutive sessions  Progressing/ Not Met 7/17/2023   In: 80% min cues (3/3) Goal Met 4/25/23  Out: 85% gestural cues and a model  On: 90% min cues (3/3) Goal met 6/19/2023  By: 75% some models and gestures    7. Will use and demonstrate understanding of she, he, my, his, hers, her, him pronouns with 80% accuracy given minimal to no cues over 3 consecutive sessions.  Progressing/ Not Met 7/17/2023   She/He: 70% mod cues      8. Produce /s/ in all positions at the word, phrase, sentence, and conversation level with 80% accuracy given minimal to no cues over 3 consecutive sessions.  Progressing/ Not Met 7/17/2023  Initial:  Words: 80% min cues (3/3) Goal Met 5/22/23  Phrases: 80% min cues (1/3)    Medial:   Words: 80% min cues (1/3)    Final:   Words: 80% min cues (2/3)     Long Term Objectives: 6 months  Joaquina will:  1. Improve receptive and expressive language skills closer to age-appropriate levels as measured by formal and/or informal measures.  2. Caregiver will understand and use strategies independently to facilitate targeted therapy skills and functional communication.     Goals Met:  1. Answer simple yes/no questions with 80% accuracy over three consecutive sessions. GOAL MET 4/3/2023  2. Use present progressive verbs with 80% accuracy over three consecutive sessions. GOAL MET 5/8/23  3. Label objects and actions with 80% accuracy per session across 3 consecutive sessions. GOAL MET 5/22/23  6. Use regular plurals with 80% accuracy over three consecutive sessions. GOAL MET 6/26/23  Patient Education/Response:   SLP and caregiver  "discussed plan for Joaquina's targets for therapy. SLP educated caregivers on strategies used in speech therapy to demonstrate carryover of skills into everyday environments. Caregiver did demonstrate understanding of all discussed this date.     Home program established: yes-to be established. Clinician explained to grandmother that it is important for grandmother to narrate and explain every   day actions and events to facilitate language learning at home.     Exercises were reviewed and Joaquina was able to demonstrate them prior to the end of the session.  Joaquina demonstrated good  understanding of the education provided.     See EMR under Patient Instructions for exercises provided throughout therapy.  Assessment:   Joaquina is progressing toward her goals. Joaquina continues to present with receptive expressive language deficits. Joaquina was observed to decrease in attention to task this session. She was observed to decrease in answering "where" questions out of a field of 3 however she increased in demonstrating understanding of subjective pronouns he/she and producing initial s in words at the phrase level and medial/final s in words at the word level. Clinician will continue to target current goals. Current goals remain appropriate. Goals will be added and re-assessed as needed.      Pt prognosis is Good. Pt will continue to benefit from skilled outpatient speech and language therapy to address the deficits listed in the problem list on initial evaluation, provide pt/family education and to maximize pt's level of independence in the home and community environment.     Medical necessity is demonstrated by the following IMPAIRMENTS:  Receptive Expressive Language Deficits  Barriers to Therapy: none  The patient's spiritual, cultural, social, and educational needs were considered and the patient is agreeable to plan of care.   Plan:   Continue Plan of Care for 1 time per week for 6 months to address language " concerns.    Clara Nieto, JUDSON-MICAH   7/27/2023

## 2025-02-25 NOTE — ASSESSMENT
[FreeTextEntry1] : 73 y/o M with beta thal minor with apparent CAD no PCI in the past, on aspirin with PMF with severe thrombocytosis here for follow up.  BMBx on 4/27/22 showing evidence of PMN with JAK2 positivity. BM showed hypercellularity (>90% cellularity) and megakaryocytes are increased in number with many large hyperlobulated megakaryocytes with abundant cytoplasm and occasional hyperchromatic megakaryocytes w/ high N/C ratio. Megakaryocytes demonstrated clustering. There was also the presence of grade 0-1 reticulin fibrosis. Given this, this possibly can represent PMF although possibility of ET not ruled out. Discussed with hematopathology extensively and considering significant clustering and hypercellularity, this marrow is a little bit less consistent with a diagnosis of essential thrombocythemia and more likely represents a PMF diagnosis.  On risk stratification, on presentation considered intermediate risk via both GIPSS and MIPSS scoring system - discussed this with patient and wife after diagnosis. CALR and MPL were negative on peripheral blood with (+) JAK2 mutation. On presentation he was additionally found to have elevated T. Bili and US abdomen showed HSM. Encouraged abstinence from alcohol and this has improved to normal.  Since starting Ruxolitinib he has been with improvement of symptoms (mainly fatigue) and thrombocytosis- however anemia remains a dose limiting issue and now with worsening symptoms of anemia. Also with worsening thrombocytosis over 1M. Referred for transplant evaluation, currently considering options. Ruxolitinib was at 10mg BID but had worsening thrombcytosis and anemia. Had added hydroxyurea 500 mg QOD to control platelet count with decreasing Ruxolitinib dose, but was ineffective. Plan had been for possible switch to momelotinib - a newer JAK2 inhibitor which can have erythropoietic effects. Goal of therapy is to decrease thrombotic risk and increase quality of life.   Had been on Ruxolitinib taper down to 5 mg BID with continued hydroxyurea 500 mg daily. CBC then showed improvement in counts and PLT were 394K. Given improvement of his counts as well as symptoms, Ruxolitinib 5 mg BID was continued, and Hydea was stopped on 7/22/24 and the restarted in January for increasing PLT count of 1.2K.  Plan: -CBC PLT showing decrease to 747 Hgb 9.5 -Continue Ruxolitinib 5 mg BID. and Hydrea at this time. -May consider Momelotinib in the future.  -One of the challenges with him is that he also has thalassemia trait, so he has very mild baseline microcytosis. No indication for iron supplementation. -Patient will continue on aspirin 81mg PO q day for thrombosis prophylaxis. He has been taking as prescribed. At last visit checked vWF panel given platelets >1000k and normal. -Continued discussions surrounding allogeneic transplantation and considering options. Discussed the risks and benefits of undergoing allogeneic bone marrow transplant. Discussed that with his disease, the transplantation is the only way to cure, and without the transplantation there is a risk of development of AML in the future. Discussed the risk for GVHD and procedure related mortality with the allogeneic transplant. He vocalized his understanding. -Continue follow up with Cardiology. -Patient would like to pursue knee replacement later this year. Will coordinate with Ortho for recommendations at that time.  -Patient understands and agrees with plan. All information explained to the best of my ability.  -Patient is going to [place] for 2 months and will return in May. We discussed the importance of continued blood monitoring at this time, and he was given a script for repeat labs in 1 month and we will re-evaluate plan at that time.  -RTC on return from [place] scheduled.

## 2025-02-25 NOTE — REVIEW OF SYSTEMS
[Fatigue] : fatigue [SOB on Exertion] : shortness of breath during exertion [Diarrhea: Grade 0] : Diarrhea: Grade 0 [Joint Pain] : joint pain [Joint Stiffness] : joint stiffness [Insomnia] : insomnia [Negative] : Neurological [Fever] : no fever [Chills] : no chills [Night Sweats] : no night sweats [Recent Change In Weight] : ~T no recent weight change [Eye Pain] : no eye pain [Vision Problems] : vision problems [Dysphagia] : no dysphagia [Nosebleeds] : no nosebleeds [Odynophagia] : no odynophagia [Shortness Of Breath] : no shortness of breath [Wheezing] : no wheezing [Cough] : no cough [Muscle Pain] : no muscle pain [Suicidal] : not suicidal [Anxiety] : no anxiety [Depression] : no depression [Proptosis] : no proptosis [Hot Flashes] : no hot flashes [Muscle Weakness] : no muscle weakness [Deepening Of The Voice] : no deepening of the voice [Easy Bleeding] : no tendency for easy bleeding [Easy Bruising] : a tendency for easy bruising [Swollen Glands] : no swollen glands [FreeTextEntry3] : wears glasses [FreeTextEntry9] : per HPI

## 2025-02-25 NOTE — HISTORY OF PRESENT ILLNESS
[de-identified] : 72 y/o M with CAD (no PCI-refused) with LBBB following with Dr. Nascimento, also with history of thalassemia minor, and hypothyroid on synthroid here for evaluation of thrombocytosis. Patient had surgery for inguinal hernia in 2019, and he reported that he still has "something in there" that feels like a bump. Once in a while he said it will bother him but doesn't last very long. Previously had L sided inguinal hernia surgery over a decade ago and had a mesh placed which was complicated by bleeding. No history of having spleen removed. Patient denied any chest pain or dyspnea on exertion. He denied any increase in fatigue lately and he has a normal appetite. He has IBS that he has had for a very long time. Denied any night sweats. He has never had a blood clot, and he denied any family history of blood clots. Denied any history of bleeding episodes. No new skin rashes. He complains of chronic knee pain bilaterally that he has been suffering from for decades due to his job. He has a torn ACL, gets cortisone injections here and there. His R knee may be a little bit swollen and he feels he will need a replacement eventually. Otherwise he also has some herniated discs in his neck which give him a little pain, he will be doing PT. Has history of 2 arthroscopies on his R knee but otherwise no orthopedic history. Also complains of tinnitus for 3-4 years, constantly there but it varies in its intensity. Alcohol aggravates it so he has cut back recently. He typically used to drink "quite a bit" of alcohol but lately its been one drink daily on average. Was a light smoker in his 30s but not since then. No other illicit drugs.  Platelet levels on initial evaluation were sustained over 1 million, and went up over 1.4 million. vWF activity and antigen level were normal. 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 late ET with developing MF. Grade 0-1 reticulin fibrosis seen. He is taking aspirin daily..Bili had been noted to be elevated on blood work and had an abdominal US which showed some hepatosplenomegaly (mild) but otherwise unremarkable. Started on Jakafi which has been fairly effective.  Of note, has also been dealing with severe OA in R knee and back pain related to spinal stenosis.   Disease: PMF   Pathology: (4/27/22)Final Diagnosis : 1, 2. Bone marrow biopsy and bone marrow aspirate  - JAK2 positive myeloproliferative neoplasm  - Hypercellular bone marrow (more than 90% cellularity)  with erythroid predominant trilineage hematopoiesis and megakaryocytosis  Diagnostic Note: Per chart review, the patient with mild microcytic anemia, with a known  history of thalassemia minor and thrombocytosis. [id number] is positive.  Current bone marrow biopsy shows hypercellular bone marrow (more than  90% cellularity) with erythroid predominant trilineage hematopoiesis  and megakaryocytosis. Please note findings of a normal male karyotype, negative PDGFRA  FISH arrangement. [facility] myeloid NGS panel shows the following genomic  alterations Tier I: Variants of Strong Clinical Significance: ASXL1  p.Sum099Hinlj*9 ( [place] Freq.: 44%), JAK2 p.Kio181Bic ( [place] Freq.:  28%) , Tier II: Variants of Potential Clinical Significance SF3B1 p.Zyj314Kkq ([place] Freq.: 6%).  Based on the additional findings, the diagnosis has not changed.  Morphology: Microscopic description: 1. Biopsy: Sections of bone marrow biopsy and bone marrow fragments in  clot show hypercellularity (more than 90% cellularity), with erythroid  predominant trilineage hematopoiesis with maturation. M:E ratio  is reduced. Megakaryocytes are increased in number with many  large hyperlobulated megakaryocytes with abundant cytoplasm.  Occasional hyperchromatic megakaryocytes high N/C ratio. Megkarayocytes  demonstrates clustering. Iron stores are present.  2. Aspirate: Spicular and cellular; adequate for interpretation. Myeloid  and erythroid elements show progressive maturation with no  overt dysplasia or increase in blasts. Occasional erythroid elements show  budding. Megakaryocytes are increased in number and show clustering and  shows many hyperlobulated forms.   Tumor/ Prognostic Markers: Result: Normal male karyotype Karyotype: 46,XY[20].   Prognostics Scoring System: MIPSS70+ = 4 (intermediate risk), Myelofibrosis Scoring System: GIPSS: Int - 2   Current Treatment Status: Therapy: Jakafi.   [de-identified] : Patient seen for follow up on. Overall, he is in his usual state of health. He continues to have periods of fatigue unchanged from last visit. He is still on 5 mg BID of ruxolitinib and was started on daily Hydrea 500mg daily at last visit for increasing PLT count. No night sweats or fevers. He has mild shortness of breath occasionally associated with exertion. Still considering R knee replacement for chronic pain. Taking Tramadol and Aleve as needed with good relief. Developed a bruise yesterday after hitting arm hard on the banister. Appetite is stable weight is stable.

## 2025-02-25 NOTE — PHYSICAL EXAM
[Fully active, able to carry on all pre-disease performance without restriction] : Status 0 - Fully active, able to carry on all pre-disease performance without restriction [Normal] : affect appropriate [Ulcers] : no ulcers [Vesicles] : no vesicles [de-identified] : supple [de-identified] : (+)S1S2 RRR [de-identified] : no edema [de-identified] : warm/dry (+) bruise on L forearm

## 2025-02-25 NOTE — PHYSICAL EXAM
[Fully active, able to carry on all pre-disease performance without restriction] : Status 0 - Fully active, able to carry on all pre-disease performance without restriction [Normal] : affect appropriate [Ulcers] : no ulcers [Vesicles] : no vesicles [de-identified] : supple [de-identified] : (+)S1S2 RRR [de-identified] : no edema [de-identified] : warm/dry (+) bruise on L forearm

## 2025-02-25 NOTE — HISTORY OF PRESENT ILLNESS
[de-identified] : 74 y/o M with CAD (no PCI-refused) with LBBB following with Dr. Nascimento, also with history of thalassemia minor, and hypothyroid on synthroid here for evaluation of thrombocytosis. Patient had surgery for inguinal hernia in 2019, and he reported that he still has "something in there" that feels like a bump. Once in a while he said it will bother him but doesn't last very long. Previously had L sided inguinal hernia surgery over a decade ago and had a mesh placed which was complicated by bleeding. No history of having spleen removed. Patient denied any chest pain or dyspnea on exertion. He denied any increase in fatigue lately and he has a normal appetite. He has IBS that he has had for a very long time. Denied any night sweats. He has never had a blood clot, and he denied any family history of blood clots. Denied any history of bleeding episodes. No new skin rashes. He complains of chronic knee pain bilaterally that he has been suffering from for decades due to his job. He has a torn ACL, gets cortisone injections here and there. His R knee may be a little bit swollen and he feels he will need a replacement eventually. Otherwise he also has some herniated discs in his neck which give him a little pain, he will be doing PT. Has history of 2 arthroscopies on his R knee but otherwise no orthopedic history. Also complains of tinnitus for 3-4 years, constantly there but it varies in its intensity. Alcohol aggravates it so he has cut back recently. He typically used to drink "quite a bit" of alcohol but lately its been one drink daily on average. Was a light smoker in his 30s but not since then. No other illicit drugs.  Platelet levels on initial evaluation were sustained over 1 million, and went up over 1.4 million. vWF activity and antigen level were normal. 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 late ET with developing MF. Grade 0-1 reticulin fibrosis seen. He is taking aspirin daily..Bili had been noted to be elevated on blood work and had an abdominal US which showed some hepatosplenomegaly (mild) but otherwise unremarkable. Started on Jakafi which has been fairly effective.  Of note, has also been dealing with severe OA in R knee and back pain related to spinal stenosis.   Disease: PMF   Pathology: (4/27/22)Final Diagnosis : 1, 2. Bone marrow biopsy and bone marrow aspirate  - JAK2 positive myeloproliferative neoplasm  - Hypercellular bone marrow (more than 90% cellularity)  with erythroid predominant trilineage hematopoiesis and megakaryocytosis  Diagnostic Note: Per chart review, the patient with mild microcytic anemia, with a known  history of thalassemia minor and thrombocytosis. [id number] is positive.  Current bone marrow biopsy shows hypercellular bone marrow (more than  90% cellularity) with erythroid predominant trilineage hematopoiesis  and megakaryocytosis. Please note findings of a normal male karyotype, negative PDGFRA  FISH arrangement. [place] myeloid NGS panel shows the following genomic  alterations Tier I: Variants of Strong Clinical Significance: ASXL1  p.Aje929Glpct*9 ( [place] Freq.: 44%), JAK2 p.Txi650Ztv ( [place] Freq.:  28%) , Tier II: Variants of Potential Clinical Significance SF3B1 p.Nfl516Vlg ([place]le[place] Freq.: 6%).  Based on the additional findings, the diagnosis has not changed.  Morphology: Microscopic description: 1. Biopsy: Sections of bone marrow biopsy and bone marrow fragments in  clot show hypercellularity (more than 90% cellularity), with erythroid  predominant trilineage hematopoiesis with maturation. M:E ratio  is reduced. Megakaryocytes are increased in number with many  large hyperlobulated megakaryocytes with abundant cytoplasm.  Occasional hyperchromatic megakaryocytes high N/C ratio. Megkarayocytes  demonstrates clustering. Iron stores are present.  2. Aspirate: Spicular and cellular; adequate for interpretation. Myeloid  and erythroid elements show progressive maturation with no  overt dysplasia or increase in blasts. Occasional erythroid elements show  budding. Megakaryocytes are increased in number and show clustering and  shows many hyperlobulated forms.   Tumor/ Prognostic Markers: Result: Normal male karyotype Karyotype: 46,XY[20].   Prognostics Scoring System: MIPSS70+ = 4 (intermediate risk), Myelofibrosis Scoring System: GIPSS: Int - 2   Current Treatment Status: Therapy: Jakafi.   [de-identified] : Patient seen for follow up on. Overall, he is in his usual state of health. He continues to have periods of fatigue unchanged from last visit. He is still on 5 mg BID of ruxolitinib and was started on daily Hydrea 500mg daily at last visit for increasing PLT count. No night sweats or fevers. He has mild shortness of breath occasionally associated with exertion. Still considering R knee replacement for chronic pain. Taking Tramadol and Aleve as needed with good relief. Developed a bruise yesterday after hitting arm hard on the banister. Appetite is stable weight is stable.

## 2025-05-22 NOTE — PHYSICAL EXAM
[Fully active, able to carry on all pre-disease performance without restriction] : Status 0 - Fully active, able to carry on all pre-disease performance without restriction [Normal] : affect appropriate [Ulcers] : no ulcers [Vesicles] : no vesicles [de-identified] : supple [de-identified] : (+)S1S2 RRR [de-identified] : no edema [de-identified] : warm/dry (+) bruise on L forearm

## 2025-05-22 NOTE — HISTORY OF PRESENT ILLNESS
[de-identified] : 72 y/o M with CAD (no PCI-refused) with LBBB following with Dr. Nascimento, also with history of thalassemia minor, and hypothyroid on synthroid here for evaluation of thrombocytosis. Patient had surgery for inguinal hernia in 2019, and he reported that he still has "something in there" that feels like a bump. Once in a while he said it will bother him but doesn't last very long. Previously had L sided inguinal hernia surgery over a decade ago and had a mesh placed which was complicated by bleeding. No history of having spleen removed. Patient denied any chest pain or dyspnea on exertion. He denied any increase in fatigue lately and he has a normal appetite. He has IBS that he has had for a very long time. Denied any night sweats. He has never had a blood clot, and he denied any family history of blood clots. Denied any history of bleeding episodes. No new skin rashes. He complains of chronic knee pain bilaterally that he has been suffering from for decades due to his job. He has a torn ACL, gets cortisone injections here and there. His R knee may be a little bit swollen and he feels he will need a replacement eventually. Otherwise he also has some herniated discs in his neck which give him a little pain, he will be doing PT. Has history of 2 arthroscopies on his R knee but otherwise no orthopedic history. Also complains of tinnitus for 3-4 years, constantly there but it varies in its intensity. Alcohol aggravates it so he has cut back recently. He typically used to drink "quite a bit" of alcohol but lately its been one drink daily on average. Was a light smoker in his 30s but not since then. No other illicit drugs.  Platelet levels on initial evaluation were sustained over 1 million, and went up over 1.4 million. vWF activity and antigen level were normal. 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 late ET with developing MF. Grade 0-1 reticulin fibrosis seen. He is taking aspirin daily..Bili had been noted to be elevated on blood work and had an abdominal US which showed some hepatosplenomegaly (mild) but otherwise unremarkable. Started on Jakafi which has been fairly effective.  Of note, has also been dealing with severe OA in R knee and back pain related to spinal stenosis.   Disease: PMF   Pathology: (4/27/22)Final Diagnosis : 1, 2. Bone marrow biopsy and bone marrow aspirate  - JAK2 positive myeloproliferative neoplasm  - Hypercellular bone marrow (more than 90% cellularity)  with erythroid predominant trilineage hematopoiesis and megakaryocytosis  Diagnostic Note: Per chart review, the patient with mild microcytic anemia, with a known  history of thalassemia minor and thrombocytosis. [id number] is positive.  Current bone marrow biopsy shows hypercellular bone marrow (more than  90% cellularity) with erythroid predominant trilineage hematopoiesis  and megakaryocytosis. Please note findings of a normal male karyotype, negative PDGFRA  FISH arrangement. [facility] myeloid NGS panel shows the following genomic  alterations Tier I: Variants of Strong Clinical Significance: ASXL1  p.Bgb794Jecgw*9 ( [place] Freq.: 44%), JAK2 p.Vkz534Bsn ( [place] Freq.:  28%) , Tier II: Variants of Potential Clinical Significance SF3B1 p.Zof044Occ (Alle[place] Freq.: 6%).  Based on the additional findings, the diagnosis has not changed.  Morphology: Microscopic description: 1. Biopsy: Sections of bone marrow biopsy and bone marrow fragments in  clot show hypercellularity (more than 90% cellularity), with erythroid  predominant trilineage hematopoiesis with maturation. M:E ratio  is reduced. Megakaryocytes are increased in number with many  large hyperlobulated megakaryocytes with abundant cytoplasm.  Occasional hyperchromatic megakaryocytes high N/C ratio. Megkarayocytes  demonstrates clustering. Iron stores are present.  2. Aspirate: Spicular and cellular; adequate for interpretation. Myeloid  and erythroid elements show progressive maturation with no  overt dysplasia or increase in blasts. Occasional erythroid elements show  budding. Megakaryocytes are increased in number and show clustering and  shows many hyperlobulated forms.   Tumor/ Prognostic Markers: Result: Normal male karyotype Karyotype: 46,XY[20].   Prognostics Scoring System: MIPSS70+ = 4 (intermediate risk), Myelofibrosis Scoring System: GIPSS: Int - 2   Current Treatment Status: Therapy: Jakafi.   [de-identified] : Patient seen for follow up on 05/22/2025. Complaining of knee pains which have worsened to the point that he is considering. Also with fatigue related to poor sleep habits. Appetite is OK, he is eating normally. Weight has been completely stable at this point. No night sweats or fevers, no abdominal pain or swelling.

## 2025-05-22 NOTE — ASSESSMENT
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  BMBx on 4/27/22 showing evidence of PMN with JAK2 positivity. BM showed hypercellularity (>90% cellularity) and megakaryocytes are increased in number with many large hyperlobulated megakaryocytes with abundant cytoplasm and occasional hyperchromatic megakaryocytes w/ high N/C ratio. Megakaryocytes demonstrated clustering. There was also the presence of grade 0-1 reticulin fibrosis. Given this, this possibly can represent PMF although possibility of ET not ruled out. Discussed with hematopathology extensively and considering significant clustering and hypercellularity, this marrow is a little bit less consistent with a diagnosis of essential thrombocythemia and more likely represents a PMF diagnosis.  On risk stratification, on presentation considered intermediate risk via both GIPSS and MIPSS scoring system - discussed this with patient and wife after diagnosis. CALR and MPL were negative on peripheral blood with (+) JAK2 mutation. On presentation he was additionally found to have elevated T. Bili and US abdomen showed HSM. Encouraged abstinence from alcohol and this has improved to normal.  Since starting Ruxolitinib he has been with improvement of symptoms (mainly fatigue and HSM) and thrombocytosis- however anemia remained a dose limiting issue and subsequently with worsening symptoms of anemia. Also with worsening thrombocytosis over 1M.   Referred for transplant evaluation, but patient forgoing at this point. Had added hydroxyurea 500 mg QOD to control platelet count while concurrently decreasing Ruxolitinib dose. Plan had been for possible switch to momelotinib - a newer JAK2 inhibitor which can have erythropoietic effects. Goal of therapy is to decrease thrombotic risk and increase quality of life. However, on Ruxolitinib taper down to 5 mg BID with continued hydroxyurea 500 mg daily- symptomatically stable and with better counts. Given this improvement of his counts as well as symptoms, Ruxolitinib 5 mg BID was continued.  Plan: -Reviewed CBC today - plt count is down to 246k, the most "in control" it has ever been, with a stable hemoglobin of 11.5 (actually slightly improved) and WBC 5.4 with ANC >4k.  -Continue Ruxolitinib 5 mg BID. and Hydrea 500 mg daily at this time. Offered encouragement that there is a possibility this could be a steady regimen going forward, as there have been many alterations in the recent past.  -May consider Momelotinib in the future if anemia becoming an issue again.  -One of the challenges with him is that he also has thalassemia trait, so he has very mild baseline microcytosis. No indication for iron supplementation. -Patient will continue on aspirin 81mg PO q day for thrombosis prophylaxis. He has been taking as prescribed.  -Continued discussions surrounding allogeneic transplantation and considering options. Again discussed the risks and benefits of undergoing allogeneic bone marrow transplant as the wife inquired again. Discussed that with his disease, the transplantation is the only way to cure, with the present but low risk for GVHD and procedure related mortality with the allogeneic transplant. -Continue follow up with Cardiology. -Patient would like to pursue knee replacement- there is no hematologic contraindication. Would continue Jakafi delmis-operatively but hold hydroxyurea as this has established effect of decreasing wound healing.   -Patient understands and agrees with plan. All information explained to the best of my ability.  -RTC in 6 weeks.

## 2025-05-22 NOTE — REVIEW OF SYSTEMS
[Fatigue] : fatigue [Vision Problems] : vision problems [SOB on Exertion] : shortness of breath during exertion [Diarrhea: Grade 0] : Diarrhea: Grade 0 [Joint Pain] : joint pain [Joint Stiffness] : joint stiffness [Insomnia] : insomnia [Easy Bruising] : a tendency for easy bruising [Negative] : Neurological [Fever] : no fever [Chills] : no chills [Night Sweats] : no night sweats [Recent Change In Weight] : ~T no recent weight change [Eye Pain] : no eye pain [Dysphagia] : no dysphagia [Nosebleeds] : no nosebleeds [Odynophagia] : no odynophagia [Shortness Of Breath] : no shortness of breath [Wheezing] : no wheezing [Cough] : no cough [Muscle Pain] : no muscle pain [Suicidal] : not suicidal [Anxiety] : no anxiety [Depression] : no depression [Proptosis] : no proptosis [Hot Flashes] : no hot flashes [Muscle Weakness] : no muscle weakness [Deepening Of The Voice] : no deepening of the voice [Easy Bleeding] : no tendency for easy bleeding [Swollen Glands] : no swollen glands [FreeTextEntry3] : wears glasses [FreeTextEntry9] : per HPI

## 2025-07-10 NOTE — HISTORY OF PRESENT ILLNESS
[de-identified] : 74 y/o M with CAD (no PCI-refused) with LBBB following with Dr. Nascimento, also with history of thalassemia minor, and hypothyroid on synthroid here for evaluation of thrombocytosis. Patient had surgery for inguinal hernia in 2019, and he reported that he still has "something in there" that feels like a bump. Once in a while he said it will bother him but doesn't last very long. Previously had L sided inguinal hernia surgery over a decade ago and had a mesh placed which was complicated by bleeding. No history of having spleen removed. Patient denied any chest pain or dyspnea on exertion. He denied any increase in fatigue lately and he has a normal appetite. He has IBS that he has had for a very long time. Denied any night sweats. He has never had a blood clot, and he denied any family history of blood clots. Denied any history of bleeding episodes. No new skin rashes. He complains of chronic knee pain bilaterally that he has been suffering from for decades due to his job. He has a torn ACL, gets cortisone injections here and there. His R knee may be a little bit swollen and he feels he will need a replacement eventually. Otherwise he also has some herniated discs in his neck which give him a little pain, he will be doing PT. Has history of 2 arthroscopies on his R knee but otherwise no orthopedic history. Also complains of tinnitus for 3-4 years, constantly there but it varies in its intensity. Alcohol aggravates it so he has cut back recently. He typically used to drink "quite a bit" of alcohol but lately its been one drink daily on average. Was a light smoker in his 30s but not since then. No other illicit drugs.  Platelet levels on initial evaluation were sustained over 1 million, and went up over 1.4 million. vWF activity and antigen level were normal. 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 late ET with developing MF. Grade 0-1 reticulin fibrosis seen. He is taking aspirin daily..Bili had been noted to be elevated on blood work and had an abdominal US which showed some hepatosplenomegaly (mild) but otherwise unremarkable. Started on Jakafi which has been fairly effective.  Of note, has also been dealing with severe OA in R knee and back pain related to spinal stenosis.   Disease: PMF   Pathology: (4/27/22)Final Diagnosis : 1, 2. Bone marrow biopsy and bone marrow aspirate  - JAK2 positive myeloproliferative neoplasm  - Hypercellular bone marrow (more than 90% cellularity)  with erythroid predominant trilineage hematopoiesis and megakaryocytosis  Diagnostic Note: Per chart review, the patient with mild microcytic anemia, with a known  history of thalassemia minor and thrombocytosis. [id number] is positive.  Current bone marrow biopsy shows hypercellular bone marrow (more than  90% cellularity) with erythroid predominant trilineage hematopoiesis  and megakaryocytosis. Please note findings of a normal male karyotype, negative PDGFRA  FISH arrangement. IES myeloid NGS panel shows the following genomic  alterations Tier I: Variants of Strong Clinical Significance: ASXL1  p.[id number]*9 ( [place] Freq.: 44%), JAK2 p.[id number] ( [place] Freq.:  28%) , Tier II: Variants of Potential Clinical Significance SF3B1 p.[id number] ([place] Freq.: 6%).  Based on the additional findings, the diagnosis has not changed.  Morphology: Microscopic description: 1. Biopsy: Sections of bone marrow biopsy and bone marrow fragments in  clot show hypercellularity (more than 90% cellularity), with erythroid  predominant trilineage hematopoiesis with maturation. M:E ratio  is reduced. Megakaryocytes are increased in number with many  large hyperlobulated megakaryocytes with abundant cytoplasm.  Occasional hyperchromatic megakaryocytes high N/C ratio. Megkarayocytes  demonstrates clustering. Iron stores are present.  2. Aspirate: Spicular and cellular; adequate for interpretation. Myeloid  and erythroid elements show progressive maturation with no  overt dysplasia or increase in blasts. Occasional erythroid elements show  budding. Megakaryocytes are increased in number and show clustering and  shows many hyperlobulated forms.   Tumor/ Prognostic Markers: Result: Normal male karyotype Karyotype: 46,XY[20].   Prognostics Scoring System: MIPSS70+ = 4 (intermediate risk), Myelofibrosis Scoring System: GIPSS: Int - 2   Current Treatment Status: Therapy: Jakafi.   [de-identified] : Patient seen for follow up on 07/10/2025. He was about 2 weeks post-operative, dealing with a lot of pain and swelling at this point. He reported that he hasn't been able to sleep very well. Taking 5 mg oxycodone (in the form of percocet) and with celecoxib 200 mg. Also taking 325 mg aspirin BID for 1 month post-operatively. Appetite is maybe a little bit less than previous. He hasn't started rehabbing the knee yet.

## 2025-07-10 NOTE — PHYSICAL EXAM
[Fully active, able to carry on all pre-disease performance without restriction] : Status 0 - Fully active, able to carry on all pre-disease performance without restriction [Normal] : affect appropriate [Ulcers] : no ulcers [Vesicles] : no vesicles [de-identified] : supple [de-identified] : (+)S1S2 RRR [de-identified] : no edema [de-identified] : warm/dry (+) bruise on L forearm
